# Patient Record
Sex: MALE | Race: BLACK OR AFRICAN AMERICAN | NOT HISPANIC OR LATINO | Employment: PART TIME | URBAN - METROPOLITAN AREA
[De-identification: names, ages, dates, MRNs, and addresses within clinical notes are randomized per-mention and may not be internally consistent; named-entity substitution may affect disease eponyms.]

---

## 2017-05-21 ENCOUNTER — HOSPITAL ENCOUNTER (EMERGENCY)
Facility: HOSPITAL | Age: 27
Discharge: HOME | End: 2017-05-21
Attending: EMERGENCY MEDICINE
Payer: MEDICAID

## 2017-05-21 VITALS
DIASTOLIC BLOOD PRESSURE: 61 MMHG | WEIGHT: 155 LBS | TEMPERATURE: 99 F | OXYGEN SATURATION: 99 % | HEART RATE: 80 BPM | SYSTOLIC BLOOD PRESSURE: 108 MMHG | RESPIRATION RATE: 18 BRPM

## 2017-05-21 DIAGNOSIS — R05.9 COUGH: ICD-10-CM

## 2017-05-21 DIAGNOSIS — J06.9 VIRAL URI WITH COUGH: Primary | ICD-10-CM

## 2017-05-21 LAB
FLUAV AG SPEC QL IA: NEGATIVE
FLUBV AG SPEC QL IA: NEGATIVE
SPECIMEN SOURCE: NORMAL

## 2017-05-21 PROCEDURE — 87400 INFLUENZA A/B EACH AG IA: CPT | Mod: 59

## 2017-05-21 PROCEDURE — 99284 EMERGENCY DEPT VISIT MOD MDM: CPT

## 2017-05-21 PROCEDURE — 25000003 PHARM REV CODE 250: Performed by: EMERGENCY MEDICINE

## 2017-05-21 RX ORDER — IBUPROFEN 600 MG/1
600 TABLET ORAL
Status: COMPLETED | OUTPATIENT
Start: 2017-05-21 | End: 2017-05-21

## 2017-05-21 RX ORDER — BENZONATATE 100 MG/1
100 CAPSULE ORAL 3 TIMES DAILY PRN
Qty: 20 CAPSULE | Refills: 0 | Status: SHIPPED | OUTPATIENT
Start: 2017-05-21 | End: 2017-05-31

## 2017-05-21 RX ADMIN — IBUPROFEN 600 MG: 600 TABLET, FILM COATED ORAL at 08:05

## 2017-05-22 NOTE — ED NOTES
Cough, cold, congestion with chills and body aches that began 1 day ago.  Reports white sputum with cough

## 2017-05-22 NOTE — ED PROVIDER NOTES
Encounter Date: 5/21/2017       History     Chief Complaint   Patient presents with    URI     pt to triage with x1 day of cough cold congestion and body aches     Review of patient's allergies indicates:  No Known Allergies  Sukumar tSephens, a 26 y.o. male, complains of cough and congestion and sharp chest pain that began this morning.  He said he took some TheraFlu with no relief.  He complains of body aches.  He said he felt like he had fever but did not verified with a thermometer.  One else is ill in his home.    Pain location: Body aches  Pain Severity: Moderate    Pain timing: One day  Pain character: Achy    Associated with or Modified by: (see above)              History reviewed. No pertinent past medical history.  History reviewed. No pertinent surgical history.  History reviewed. No pertinent family history.  Social History   Substance Use Topics    Smoking status: Current Every Day Smoker     Packs/day: 0.50     Types: Cigarettes    Smokeless tobacco: Not on file    Alcohol use Not on file     Review of Systems   Constitutional:        Objective fever   HENT: Positive for congestion, postnasal drip and rhinorrhea. Negative for sore throat.    Musculoskeletal: Positive for myalgias.   All other systems reviewed and are negative.      Physical Exam     Initial Vitals [05/21/17 1918]   BP Pulse Resp Temp SpO2   (!) 122/59 86 18 99.4 °F (37.4 °C) 98 %     Physical Exam    Nursing note and vitals reviewed.  Constitutional: He appears well-developed and well-nourished. No distress.   HENT:   Right Ear: External ear normal.   Left Ear: External ear normal.   Mouth/Throat: Oropharynx is clear and moist.   Eyes: Conjunctivae and EOM are normal. Pupils are equal, round, and reactive to light.   Neck: Normal range of motion. Neck supple.   Cardiovascular: Normal rate, regular rhythm and normal heart sounds.   Pulmonary/Chest: Breath sounds normal.   Occasional rhonchi on the left clears with coughing    Abdominal: Soft. There is no tenderness.   Musculoskeletal: Normal range of motion.   Neurological: He is alert and oriented to person, place, and time. He has normal strength.   Skin: Skin is warm and dry.   Psychiatric: He has a normal mood and affect. His behavior is normal. Thought content normal.         ED Course   Procedures  Labs Reviewed   INFLUENZA A AND B ANTIGEN             Medical Decision Making:   Initial Assessment:    26 y.o. male, complains of cough and congestion and sharp chest pain that began this morning.  He said he took some TheraFlu with no relief.  He complains of body aches.  He said he felt like he had fever but did not verified with a thermometer.  One else is ill in his home.    Pain location: Body aches  PE: No acute distress, afebrile, unremarkable physical exam except for mild nasal congestion  Differential Diagnosis:   Influenza, URI, bronchitis,  Clinical Tests:   Lab Tests: Ordered and Reviewed  Radiological Study: Ordered and Reviewed  ED Management:  Influenza strep and chest x-rays were all normal.  A she will be treated for an uncomplicated URI probably viral nature.                   ED Course     Clinical Impression:   The primary encounter diagnosis was Viral URI with cough. A diagnosis of Cough was also pertinent to this visit.          Jhonatan Zee Jr., MD  05/21/17 [id number]

## 2023-01-05 ENCOUNTER — TELEPHONE (OUTPATIENT)
Dept: UROLOGY | Facility: CLINIC | Age: 33
End: 2023-01-05

## 2023-01-05 ENCOUNTER — OFFICE VISIT (OUTPATIENT)
Dept: UROLOGY | Facility: CLINIC | Age: 33
End: 2023-01-05
Payer: MEDICAID

## 2023-01-05 DIAGNOSIS — N52.9 ERECTILE DYSFUNCTION, UNSPECIFIED ERECTILE DYSFUNCTION TYPE: ICD-10-CM

## 2023-01-05 DIAGNOSIS — Z31.41 ENCOUNTER FOR FERTILITY TESTING: Primary | ICD-10-CM

## 2023-01-05 DIAGNOSIS — R53.83 FATIGUE, UNSPECIFIED TYPE: ICD-10-CM

## 2023-01-05 PROCEDURE — 1160F RVW MEDS BY RX/DR IN RCRD: CPT | Mod: CPTII,95,, | Performed by: NURSE PRACTITIONER

## 2023-01-05 PROCEDURE — 1159F PR MEDICATION LIST DOCUMENTED IN MEDICAL RECORD: ICD-10-PCS | Mod: CPTII,95,, | Performed by: NURSE PRACTITIONER

## 2023-01-05 PROCEDURE — 99202 PR OFFICE/OUTPT VISIT, NEW, LEVL II, 15-29 MIN: ICD-10-PCS | Mod: 95,,, | Performed by: NURSE PRACTITIONER

## 2023-01-05 PROCEDURE — 1160F PR REVIEW ALL MEDS BY PRESCRIBER/CLIN PHARMACIST DOCUMENTED: ICD-10-PCS | Mod: CPTII,95,, | Performed by: NURSE PRACTITIONER

## 2023-01-05 PROCEDURE — 99202 OFFICE O/P NEW SF 15 MIN: CPT | Mod: 95,,, | Performed by: NURSE PRACTITIONER

## 2023-01-05 PROCEDURE — 1159F MED LIST DOCD IN RCRD: CPT | Mod: CPTII,95,, | Performed by: NURSE PRACTITIONER

## 2023-01-05 RX ORDER — TADALAFIL 5 MG/1
5 TABLET ORAL DAILY PRN
Qty: 20 TABLET | Refills: 11 | Status: SHIPPED | OUTPATIENT
Start: 2023-01-05 | End: 2023-05-03 | Stop reason: DRUGHIGH

## 2023-01-05 NOTE — TELEPHONE ENCOUNTER
Shanon and I both spoke with pt, labs and imaging scheduled and he will  specimen cup on tomorrow.    ----- Message from Joe Vasquez NP sent at 1/5/2023  2:55 PM CST -----  Regarding: appts needed  Patient needs an appt at [facility] for U/S of scrotum and testicles & testosterone level. Both on the same day. Testosterone level has to be done before 10 am. Patient also will pick-up a specimen cup and his orders for a semen analysis on tomorrow. He's aware that he has to drop this off at [facility] as well. All orders placed. Patient's aware you will give him a call today.

## 2023-01-05 NOTE — PROGRESS NOTES
Audio Only Telehealth Visit     The patient location is: Work  The chief complaint leading to consultation is: Fertility concerns and erectile issues.  Visit type: Virtual visit with audio only (telephone)  Total time spent with patient: Approximately 25 minutes     The reason for the audio only service rather than synchronous audio and video virtual visit was related to technical difficulties or patient preference/necessity.     Each patient to whom I provide medical services by telemedicine is:  (1) informed of the relationship between the physician and patient and the respective role of any other health care provider with respect to management of the patient; and (2) notified that they may decline to receive medical services by telemedicine and may withdraw from such care at any time. Patient verbally consented to receive this service via voice-only telephone call.       HPI: Patient is new to me. He is a 31 yo AAM who is present via telephone encounter with fertility concerns and erectile dysfunction. Patient reports he has been actively trying to achieve pregnancy with his partner. He reports one of his partners in the past achieved pregnancy in 2016, but she had a miscarriage. He has not been successful since. Patient reports socially using marijuana. In regards to his erectile issues, patient reports issues with achieving and maintaining an erection. Erections usually last 5-10 minutes. He reports performance anxiety. He denies any surgical history or injury to his genitals.     Assessment and plan:    Diagnoses and all orders for this visit:    Encounter for fertility testing  -     Semen Analysis; Future  -     US Scrotum And Testicles; Future    Erectile dysfunction, unspecified erectile dysfunction type  -     tadalafiL (CIALIS) 5 MG tablet; Take 1 tablet (5 mg total) by mouth daily as needed for Erectile Dysfunction.  -     Testosterone; Future    Fatigue, unspecified type  -     Testosterone;  Future    NOTE: Goodrx coupon sent to patient's cell phone.     Follow-up in 4 weeks.    Joe Vasquez NP             This service was not originating from a related E/M service provided within the previous 7 days nor will  to an E/M service or procedure within the next 24 hours or my soonest available appointment.  Prevailing standard of care was able to be met in this audio-only visit.

## 2023-01-05 NOTE — TELEPHONE ENCOUNTER
----- Message from Joe Vasquez NP sent at 1/5/2023  2:55 PM CST -----  Regarding: appts needed  Patient needs an appt at [facility] for U/S of scrotum and testicles & testosterone level. Both on the same day. Testosterone level has to be done before 10 am. Patient also will pick-up a specimen cup and his orders for a semen analysis on tomorrow. He's aware that he has to drop this off at [facility] as well. All orders placed. Patient's aware you will give him a call today.

## 2023-01-06 ENCOUNTER — TELEPHONE (OUTPATIENT)
Dept: UROLOGY | Facility: CLINIC | Age: 33
End: 2023-01-06
Payer: MEDICAID

## 2023-01-06 DIAGNOSIS — Z31.41 ENCOUNTER FOR FERTILITY TESTING: Primary | ICD-10-CM

## 2023-01-06 DIAGNOSIS — N52.9 ERECTILE DYSFUNCTION, UNSPECIFIED ERECTILE DYSFUNCTION TYPE: ICD-10-CM

## 2023-01-06 NOTE — TELEPHONE ENCOUNTER
Patient needs an appt at [facility] for U/S of scrotum and testicles & testosterone level. Both on the same day. Testosterone level has to be done before 10 am. Patient also will pick-up a specimen cup and his orders for a semen analysis on tomorrow. He's aware that he has to drop this off at [facility] as well. All orders placed. Patient's aware you will give him a call today.

## 2023-01-12 ENCOUNTER — HOSPITAL ENCOUNTER (OUTPATIENT)
Dept: RADIOLOGY | Facility: HOSPITAL | Age: 33
Discharge: HOME | End: 2023-01-12
Attending: NURSE PRACTITIONER
Payer: MEDICAID

## 2023-01-12 DIAGNOSIS — Z31.41 ENCOUNTER FOR FERTILITY TESTING: ICD-10-CM

## 2023-01-12 PROCEDURE — 76870 US SCROTUM AND TESTICLES: ICD-10-PCS | Mod: 26,,, | Performed by: STUDENT IN AN ORGANIZED HEALTH CARE EDUCATION/TRAINING PROGRAM

## 2023-01-12 PROCEDURE — 76870 US EXAM SCROTUM: CPT | Mod: 26,,, | Performed by: STUDENT IN AN ORGANIZED HEALTH CARE EDUCATION/TRAINING PROGRAM

## 2023-01-12 PROCEDURE — 76870 US EXAM SCROTUM: CPT | Mod: TC

## 2023-01-13 ENCOUNTER — TELEPHONE (OUTPATIENT)
Dept: UROLOGY | Facility: CLINIC | Age: 33
End: 2023-01-13
Payer: MEDICAID

## 2023-01-13 ENCOUNTER — LAB VISIT (OUTPATIENT)
Dept: LAB | Facility: HOSPITAL | Age: 33
End: 2023-01-13
Payer: MEDICAID

## 2023-01-13 DIAGNOSIS — Z31.41 ENCOUNTER FOR FERTILITY TESTING: ICD-10-CM

## 2023-01-13 DIAGNOSIS — N52.9 ERECTILE DYSFUNCTION, UNSPECIFIED ERECTILE DYSFUNCTION TYPE: ICD-10-CM

## 2023-01-13 PROCEDURE — 89320 SEMEN ANAL VOL/COUNT/MOT: CPT | Performed by: NURSE PRACTITIONER

## 2023-01-13 NOTE — TELEPHONE ENCOUNTER
----- Message from Joe Vasquez NP sent at 1/13/2023  2:26 PM CST -----  Please inform patient via telephone that his testosterone level was normal at 573 ng/dL.

## 2023-01-16 LAB — SPERM VIABLE NFR SMN: 40 %

## 2023-01-27 LAB
GERM CELLS, SEMEN: ABNORMAL
PH SMN: 8 [PH]
PMN'S/100 SPERMATAZOA: 0 %
SPECIMEN VOL SMN: 1.1 ML
SPERM # SMN: 68 M/ML
SPERM # SMN: 75 M
SPERM MOTILE NFR SMN: 6 %
SPERM NORM MOTILE # SMN: 0.9 M (ref 50–?)
SPERM NORM NFR SMN: 20 %
SPERM PROG NFR SMN: 4 %
SPERM SMN: ABNORMAL
VISC SMN QL: ABNORMAL
WBC # SMN: 0 M/ML (ref 0–1)

## 2023-05-03 ENCOUNTER — OFFICE VISIT (OUTPATIENT)
Dept: UROLOGY | Facility: CLINIC | Age: 33
End: 2023-05-03
Payer: MEDICAID

## 2023-05-03 VITALS
HEIGHT: 74 IN | DIASTOLIC BLOOD PRESSURE: 63 MMHG | SYSTOLIC BLOOD PRESSURE: 109 MMHG | HEART RATE: 54 BPM | WEIGHT: 156.63 LBS | BODY MASS INDEX: 20.1 KG/M2

## 2023-05-03 DIAGNOSIS — N52.9 ERECTILE DYSFUNCTION, UNSPECIFIED ERECTILE DYSFUNCTION TYPE: ICD-10-CM

## 2023-05-03 DIAGNOSIS — N46.9 MALE FERTILITY PROBLEM: Primary | ICD-10-CM

## 2023-05-03 PROCEDURE — 3078F DIAST BP <80 MM HG: CPT | Mod: CPTII,,, | Performed by: NURSE PRACTITIONER

## 2023-05-03 PROCEDURE — 3078F PR MOST RECENT DIASTOLIC BLOOD PRESSURE < 80 MM HG: ICD-10-PCS | Mod: CPTII,,, | Performed by: NURSE PRACTITIONER

## 2023-05-03 PROCEDURE — 3008F PR BODY MASS INDEX (BMI) DOCUMENTED: ICD-10-PCS | Mod: CPTII,,, | Performed by: NURSE PRACTITIONER

## 2023-05-03 PROCEDURE — 99214 PR OFFICE/OUTPT VISIT, EST, LEVL IV, 30-39 MIN: ICD-10-PCS | Mod: S$PBB,,, | Performed by: NURSE PRACTITIONER

## 2023-05-03 PROCEDURE — 1159F PR MEDICATION LIST DOCUMENTED IN MEDICAL RECORD: ICD-10-PCS | Mod: CPTII,,, | Performed by: NURSE PRACTITIONER

## 2023-05-03 PROCEDURE — 3074F PR MOST RECENT SYSTOLIC BLOOD PRESSURE < 130 MM HG: ICD-10-PCS | Mod: CPTII,,, | Performed by: NURSE PRACTITIONER

## 2023-05-03 PROCEDURE — 99999 PR PBB SHADOW E&M-EST. PATIENT-LVL III: CPT | Mod: PBBFAC,,, | Performed by: NURSE PRACTITIONER

## 2023-05-03 PROCEDURE — 1160F RVW MEDS BY RX/DR IN RCRD: CPT | Mod: CPTII,,, | Performed by: NURSE PRACTITIONER

## 2023-05-03 PROCEDURE — 1159F MED LIST DOCD IN RCRD: CPT | Mod: CPTII,,, | Performed by: NURSE PRACTITIONER

## 2023-05-03 PROCEDURE — 1160F PR REVIEW ALL MEDS BY PRESCRIBER/CLIN PHARMACIST DOCUMENTED: ICD-10-PCS | Mod: CPTII,,, | Performed by: NURSE PRACTITIONER

## 2023-05-03 PROCEDURE — 99213 OFFICE O/P EST LOW 20 MIN: CPT | Mod: PBBFAC,PO | Performed by: NURSE PRACTITIONER

## 2023-05-03 PROCEDURE — 3074F SYST BP LT 130 MM HG: CPT | Mod: CPTII,,, | Performed by: NURSE PRACTITIONER

## 2023-05-03 PROCEDURE — 3008F BODY MASS INDEX DOCD: CPT | Mod: CPTII,,, | Performed by: NURSE PRACTITIONER

## 2023-05-03 PROCEDURE — 99214 OFFICE O/P EST MOD 30 MIN: CPT | Mod: S$PBB,,, | Performed by: NURSE PRACTITIONER

## 2023-05-03 PROCEDURE — 99999 PR PBB SHADOW E&M-EST. PATIENT-LVL III: ICD-10-PCS | Mod: PBBFAC,,, | Performed by: NURSE PRACTITIONER

## 2023-05-03 RX ORDER — TADALAFIL 20 MG/1
20 TABLET ORAL DAILY PRN
Qty: 20 TABLET | Refills: 11 | Status: SHIPPED | OUTPATIENT
Start: 2023-05-03 | End: 2024-05-02

## 2023-05-03 NOTE — PATIENT INSTRUCTIONS
Semen analysis (home collect; drop specimen of to [facility] outpatient lab).   Increase tadalafil (Cialis) to 20 mg as needed daily for ED.   Follow-up pending semen analysis.

## 2023-05-03 NOTE — PROGRESS NOTES
Subjective:       Patient ID: Sukumar Stephens is a 32 y.o. male.    Chief Complaint: Follow-up (Semen )    Patient is here today for a follow-up for fertility issues. Semen analysis dated 1/13/2023 was abnormal and U/S of scrotum and testicles dated 1/12/2023 showed bilateral varicoceles which can be playing a factor in fertility issues. Re-discussed semen analysis and U/S results with patient. He's open to moving forward for varicocelectomy if that helps increase his chances to have baby. Patient also reports ED issues and trying honey packets in addition to taking two Cialis pills. He reports that is effective, but requested to increase his Cialis so he can stop taking other non-prescribed ED medications. Patient also reports a hx of anxiety.     Follow-up  This is a chronic (fertility issues) problem. The current episode started more than 1 month ago. The problem occurs daily. The problem has been unchanged. Pertinent negatives include no abdominal pain, anorexia, arthralgias, change in bowel habit, chills, fatigue, fever, headaches, nausea, swollen glands, urinary symptoms, vomiting or weakness. Nothing aggravates the symptoms. He has tried nothing for the symptoms.   Review of Systems   Constitutional:  Negative for chills, fatigue and fever.   Gastrointestinal:  Negative for abdominal pain, anorexia, change in bowel habit, constipation, diarrhea, nausea, vomiting and change in bowel habit.   Genitourinary:  Positive for erectile dysfunction. Negative for decreased urine volume, difficulty urinating, discharge, dysuria, flank pain, frequency, hematuria, penile pain, penile swelling, scrotal swelling, testicular pain and urgency.   Musculoskeletal:  Negative for arthralgias.   Neurological:  Negative for dizziness, weakness and headaches.   Psychiatric/Behavioral:  The patient is nervous/anxious.        Objective:      Physical Exam  Vitals and nursing note reviewed.   Constitutional:       General: He is not in  acute distress.     Appearance: He is well-developed and normal weight. He is not ill-appearing.   HENT:      Head: Normocephalic and atraumatic.   Eyes:      Pupils: Pupils are equal, round, and reactive to light.   Cardiovascular:      Rate and Rhythm: Bradycardia present.   Pulmonary:      Effort: Pulmonary effort is normal. No respiratory distress.   Abdominal:      Palpations: Abdomen is soft.      Tenderness: There is no abdominal tenderness.   Musculoskeletal:         General: Normal range of motion.      Cervical back: Normal range of motion.   Skin:     General: Skin is warm and dry.   Neurological:      Mental Status: He is alert and oriented to person, place, and time.      Coordination: Coordination normal.   Psychiatric:         Mood and Affect: Mood normal.         Behavior: Behavior normal.         Thought Content: Thought content normal.         Judgment: Judgment normal.       Assessment:       Problem List Items Addressed This Visit    None  Visit Diagnoses       Male fertility problem    -  Primary    Relevant Orders    Semen Analysis    Erectile dysfunction, unspecified erectile dysfunction type        Relevant Medications    tadalafiL (CIALIS) 20 MG Tab            Plan:           Sukumar MACIAS was seen today for follow-up.    Diagnoses and all orders for this visit:    Male fertility problem  -     Semen Analysis; Future    Erectile dysfunction, unspecified erectile dysfunction type  -     tadalafiL (CIALIS) 20 MG Tab; Take 1 tablet (20 mg total) by mouth daily as needed (erectile dysfunction).    Other orders  Semen analysis (home collect; drop specimen of to [facility] outpatient lab).   Increase tadalafil (Cialis) to 20 mg as needed daily for ED.     NOTE: May need varicocelectomy pending abnormal semen analysis results.      Follow-up pending semen analysis results.     Joe Vasquez NP

## 2023-09-11 ENCOUNTER — OFFICE VISIT (OUTPATIENT)
Dept: UROLOGY | Facility: CLINIC | Age: 33
End: 2023-09-11
Payer: COMMERCIAL

## 2023-09-11 VITALS
HEIGHT: 74 IN | BODY MASS INDEX: 19.48 KG/M2 | DIASTOLIC BLOOD PRESSURE: 58 MMHG | HEART RATE: 67 BPM | SYSTOLIC BLOOD PRESSURE: 100 MMHG | WEIGHT: 151.81 LBS

## 2023-09-11 DIAGNOSIS — N43.3 LEFT HYDROCELE: ICD-10-CM

## 2023-09-11 DIAGNOSIS — N46.9 MALE FERTILITY PROBLEM: Primary | ICD-10-CM

## 2023-09-11 DIAGNOSIS — N50.89 TESTICULAR MICROLITHIASIS: ICD-10-CM

## 2023-09-11 DIAGNOSIS — N50.3 EPIDIDYMAL CYST: ICD-10-CM

## 2023-09-11 DIAGNOSIS — I86.1 BILATERAL VARICOCELES: ICD-10-CM

## 2023-09-11 PROCEDURE — 99214 PR OFFICE/OUTPT VISIT, EST, LEVL IV, 30-39 MIN: ICD-10-PCS | Mod: S$GLB,,, | Performed by: NURSE PRACTITIONER

## 2023-09-11 PROCEDURE — 3008F BODY MASS INDEX DOCD: CPT | Mod: CPTII,S$GLB,, | Performed by: NURSE PRACTITIONER

## 2023-09-11 PROCEDURE — 3008F PR BODY MASS INDEX (BMI) DOCUMENTED: ICD-10-PCS | Mod: CPTII,S$GLB,, | Performed by: NURSE PRACTITIONER

## 2023-09-11 PROCEDURE — 1159F PR MEDICATION LIST DOCUMENTED IN MEDICAL RECORD: ICD-10-PCS | Mod: CPTII,S$GLB,, | Performed by: NURSE PRACTITIONER

## 2023-09-11 PROCEDURE — 99999 PR PBB SHADOW E&M-EST. PATIENT-LVL III: ICD-10-PCS | Mod: PBBFAC,,, | Performed by: NURSE PRACTITIONER

## 2023-09-11 PROCEDURE — 3078F PR MOST RECENT DIASTOLIC BLOOD PRESSURE < 80 MM HG: ICD-10-PCS | Mod: CPTII,S$GLB,, | Performed by: NURSE PRACTITIONER

## 2023-09-11 PROCEDURE — 99999 PR PBB SHADOW E&M-EST. PATIENT-LVL III: CPT | Mod: PBBFAC,,, | Performed by: NURSE PRACTITIONER

## 2023-09-11 PROCEDURE — 99213 OFFICE O/P EST LOW 20 MIN: CPT | Mod: PBBFAC,PO | Performed by: NURSE PRACTITIONER

## 2023-09-11 PROCEDURE — 1159F MED LIST DOCD IN RCRD: CPT | Mod: CPTII,S$GLB,, | Performed by: NURSE PRACTITIONER

## 2023-09-11 PROCEDURE — 1160F RVW MEDS BY RX/DR IN RCRD: CPT | Mod: CPTII,S$GLB,, | Performed by: NURSE PRACTITIONER

## 2023-09-11 PROCEDURE — 3074F SYST BP LT 130 MM HG: CPT | Mod: CPTII,S$GLB,, | Performed by: NURSE PRACTITIONER

## 2023-09-11 PROCEDURE — 99214 OFFICE O/P EST MOD 30 MIN: CPT | Mod: S$GLB,,, | Performed by: NURSE PRACTITIONER

## 2023-09-11 PROCEDURE — 3074F PR MOST RECENT SYSTOLIC BLOOD PRESSURE < 130 MM HG: ICD-10-PCS | Mod: CPTII,S$GLB,, | Performed by: NURSE PRACTITIONER

## 2023-09-11 PROCEDURE — 1160F PR REVIEW ALL MEDS BY PRESCRIBER/CLIN PHARMACIST DOCUMENTED: ICD-10-PCS | Mod: CPTII,S$GLB,, | Performed by: NURSE PRACTITIONER

## 2023-09-11 PROCEDURE — 3078F DIAST BP <80 MM HG: CPT | Mod: CPTII,S$GLB,, | Performed by: NURSE PRACTITIONER

## 2023-09-11 NOTE — PATIENT INSTRUCTIONS
U/S scrotum ans testicles (STAT)  Repeat semen analysis (home collect; drop specimen off to [facility] outpatient lab on 2nd floor within 1 hour of collection).  Follow-up pending lab and imaging results.

## 2023-09-11 NOTE — PROGRESS NOTES
Subjective:       Patient ID: Sukumar Stephens is a 32 y.o. male.    Chief Complaint: Follow-up    Patient is here today for a follow-up for fertility issues. Patient's semen analysis from 1/2023 showed motility issues. Patient also had an U/S in January that showed bilateral varicoceles, bilateral testicular microliths, a left hydrocele, and right epididymal cyst. Patient is aware that the bilateral varicoceles could be contributing to his fertility issues.     Follow-up  This is a chronic (fertility issues) problem. The current episode started more than 1 year ago. The problem has been unchanged. Pertinent negatives include no abdominal pain, change in bowel habit, chills, fatigue, fever, headaches, nausea, swollen glands, urinary symptoms, vomiting or weakness. Nothing aggravates the symptoms. He has tried nothing for the symptoms.     Review of Systems   Constitutional:  Negative for chills, fatigue and fever.   Gastrointestinal:  Negative for abdominal pain, change in bowel habit, constipation, diarrhea, nausea, vomiting and change in bowel habit.   Genitourinary:  Negative for decreased urine volume, difficulty urinating, discharge, dysuria, flank pain, frequency, hematuria, penile pain, penile swelling, scrotal swelling, testicular pain and urgency.   Musculoskeletal: Negative.    Neurological:  Negative for dizziness, weakness and headaches.   Psychiatric/Behavioral: Negative.           Objective:      Physical Exam  Vitals and nursing note reviewed.   Constitutional:       General: He is not in acute distress.     Appearance: He is well-developed and normal weight. He is not ill-appearing.   HENT:      Head: Normocephalic and atraumatic.   Eyes:      Pupils: Pupils are equal, round, and reactive to light.   Cardiovascular:      Rate and Rhythm: Normal rate.   Pulmonary:      Effort: Pulmonary effort is normal. No respiratory distress.   Abdominal:      Palpations: Abdomen is soft.      Tenderness: There is no  abdominal tenderness.   Musculoskeletal:         General: Normal range of motion.      Cervical back: Normal range of motion.   Skin:     General: Skin is warm and dry.   Neurological:      Mental Status: He is alert and oriented to person, place, and time.      Coordination: Coordination normal.   Psychiatric:         Mood and Affect: Mood normal.         Behavior: Behavior normal.         Thought Content: Thought content normal.         Judgment: Judgment normal.         Assessment:       Problem List Items Addressed This Visit    None  Visit Diagnoses       Male fertility problem    -  Primary    Fatigue, unspecified type        Bilateral varicoceles        Testicular microlithiasis        Epididymal cyst        Left hydrocele                Plan:           Sukumar MACIAS was seen today for follow-up.    Diagnoses and all orders for this visit:    Male fertility problem  -     US Scrotum And Testicles; Future  -     Semen Analysis; Future    Bilateral varicoceles  -     US Scrotum And Testicles; Future    Testicular microlithiasis  -     US Scrotum And Testicles; Future    Epididymal cyst  -     US Scrotum And Testicles; Future    Left hydrocele  -     US Scrotum And Testicles; Future    Other order  Repeat semen analysis (home collect; drop specimen off to [facility] outpatient lab on 2nd floor within 1 hour of collection).    Follow-up pending lab and imaging results.     Joe Vasquez NP

## 2023-09-12 ENCOUNTER — LAB VISIT (OUTPATIENT)
Dept: LAB | Facility: HOSPITAL | Age: 33
End: 2023-09-12
Payer: MEDICAID

## 2023-09-12 ENCOUNTER — TELEPHONE (OUTPATIENT)
Dept: UROLOGY | Facility: CLINIC | Age: 33
End: 2023-09-12
Payer: COMMERCIAL

## 2023-09-12 DIAGNOSIS — N46.9 MALE FERTILITY PROBLEM: ICD-10-CM

## 2023-09-12 DIAGNOSIS — Z01.818 PRE-OP TESTING: Primary | ICD-10-CM

## 2023-09-12 LAB — SPERM VIABLE NFR SMN: 37 %

## 2023-09-12 PROCEDURE — 89320 SEMEN ANAL VOL/COUNT/MOT: CPT | Performed by: NURSE PRACTITIONER

## 2023-09-12 NOTE — TELEPHONE ENCOUNTER
Spoke to patient and results given, he would like to move surgery date and picked a date of 9/18, he will do pre-op labs on 9/14. He is also not taking any blood thinners and was told to refrain from using any until after surgery. Notified him that they will call him with arrival time on Friday. Also notified him that we will mail surgery packet to his house for him to have.

## 2023-09-12 NOTE — TELEPHONE ENCOUNTER
----- Message from Joe Vasquez NP sent at 9/12/2023  4:05 PM CDT -----  Please inform patient that his U/S of scrotum and testicles still showed bilateral varicoceles and bilateral testicular microlithiasis. Due to fertility issues and concerns, we should move forward with scheduling sx for bilateral varicocelectomy by Dr. Rodriguez. We will continue to monitor the testicular microlithiasis (calcifications). Please help patient pick out sx date. Pre-op labs needed (CBC, BMP, U/A, and urine cx). No blood thinners noted on med list, but patient should continue to avoid them for 7-10 days prior to sx to reduce risk of bleeding. Please provide patient with surgery packet. Let me know what date patient selects. Thanks.

## 2023-09-13 ENCOUNTER — LAB VISIT (OUTPATIENT)
Dept: LAB | Facility: HOSPITAL | Age: 33
End: 2023-09-13
Payer: COMMERCIAL

## 2023-09-13 DIAGNOSIS — Z01.818 PRE-OP TESTING: ICD-10-CM

## 2023-09-13 DIAGNOSIS — N46.9 MALE FERTILITY PROBLEM: ICD-10-CM

## 2023-09-13 DIAGNOSIS — I86.1 BILATERAL VARICOCELES: Primary | ICD-10-CM

## 2023-09-13 LAB
ANION GAP SERPL CALC-SCNC: 8 MMOL/L (ref 8–16)
BASOPHILS # BLD AUTO: 0.1 K/UL (ref 0–0.2)
BASOPHILS NFR BLD: 2 % (ref 0–1.9)
BUN SERPL-MCNC: 14 MG/DL (ref 6–20)
CALCIUM SERPL-MCNC: 9.7 MG/DL (ref 8.7–10.5)
CHLORIDE SERPL-SCNC: 105 MMOL/L (ref 95–110)
CO2 SERPL-SCNC: 28 MMOL/L (ref 23–29)
CREAT SERPL-MCNC: 0.9 MG/DL (ref 0.5–1.4)
DIFFERENTIAL METHOD: ABNORMAL
EOSINOPHIL # BLD AUTO: 0.2 K/UL (ref 0–0.5)
EOSINOPHIL NFR BLD: 4.7 % (ref 0–8)
ERYTHROCYTE [DISTWIDTH] IN BLOOD BY AUTOMATED COUNT: 12.5 % (ref 11.5–14.5)
EST. GFR  (NO RACE VARIABLE): >60 ML/MIN/1.73 M^2
GLUCOSE SERPL-MCNC: 84 MG/DL (ref 70–110)
HCT VFR BLD AUTO: 36.5 % (ref 40–54)
HGB BLD-MCNC: 12.3 G/DL (ref 14–18)
IMM GRANULOCYTES # BLD AUTO: 0.01 K/UL (ref 0–0.04)
IMM GRANULOCYTES NFR BLD AUTO: 0.2 % (ref 0–0.5)
LYMPHOCYTES # BLD AUTO: 2.3 K/UL (ref 1–4.8)
LYMPHOCYTES NFR BLD: 45.7 % (ref 18–48)
MCH RBC QN AUTO: 30.5 PG (ref 27–31)
MCHC RBC AUTO-ENTMCNC: 33.7 G/DL (ref 32–36)
MCV RBC AUTO: 91 FL (ref 82–98)
MONOCYTES # BLD AUTO: 0.4 K/UL (ref 0.3–1)
MONOCYTES NFR BLD: 8.7 % (ref 4–15)
NEUTROPHILS # BLD AUTO: 2 K/UL (ref 1.8–7.7)
NEUTROPHILS NFR BLD: 38.7 % (ref 38–73)
NRBC BLD-RTO: 0 /100 WBC
PLATELET # BLD AUTO: 273 K/UL (ref 150–450)
PMV BLD AUTO: 10 FL (ref 9.2–12.9)
POTASSIUM SERPL-SCNC: 3.9 MMOL/L (ref 3.5–5.1)
RBC # BLD AUTO: 4.03 M/UL (ref 4.6–6.2)
SODIUM SERPL-SCNC: 141 MMOL/L (ref 136–145)
WBC # BLD AUTO: 5.06 K/UL (ref 3.9–12.7)

## 2023-09-13 PROCEDURE — 36415 COLL VENOUS BLD VENIPUNCTURE: CPT | Performed by: NURSE PRACTITIONER

## 2023-09-13 PROCEDURE — 80048 BASIC METABOLIC PNL TOTAL CA: CPT | Performed by: NURSE PRACTITIONER

## 2023-09-13 PROCEDURE — 85025 COMPLETE CBC W/AUTO DIFF WBC: CPT | Performed by: NURSE PRACTITIONER

## 2023-09-13 RX ORDER — CEFAZOLIN SODIUM 2 G/50ML
2 SOLUTION INTRAVENOUS
Status: CANCELLED | OUTPATIENT
Start: 2023-09-13

## 2023-09-13 RX ORDER — SODIUM CHLORIDE 9 MG/ML
INJECTION, SOLUTION INTRAVENOUS CONTINUOUS
Status: CANCELLED | OUTPATIENT
Start: 2023-09-13

## 2023-09-13 NOTE — PROGRESS NOTES
Diagnoses and all orders for this visit:    Bilateral varicoceles  -     Case Request Operating Room: EXCISION, VARICOCELE  -     Vital Signs ; Standing  -     Notify physician ; Standing  -     Diet NPO; Standing  -     Place in Outpatient; Standing  -     Place NICOLE hose; Standing  -     Diet NPO    Male fertility problem  -     Case Request Operating Room: EXCISION, VARICOCELE  -     Vital Signs ; Standing  -     Notify physician ; Standing  -     Diet NPO; Standing  -     Place in Outpatient; Standing  -     Place NICOLE hose; Standing  -     Diet NPO    Other orders  -     0.9%  NaCl infusion  -     IP VTE LOW RISK PATIENT; Standing  -     cefazolin (ANCEF) 2 gram in dextrose 5% 50 mL IVPB (premix)    Joe Vasquez, NP

## 2023-09-18 PROBLEM — N43.3 HYDROCELE, LEFT: Status: ACTIVE | Noted: 2023-09-18

## 2023-09-18 PROBLEM — I86.1 BILATERAL VARICOCELES: Status: ACTIVE | Noted: 2023-09-18

## 2023-09-18 PROBLEM — N46.9 MALE FERTILITY PROBLEM: Status: ACTIVE | Noted: 2023-09-18

## 2023-09-22 ENCOUNTER — TELEPHONE (OUTPATIENT)
Dept: UROLOGY | Facility: CLINIC | Age: 33
End: 2023-09-22
Payer: COMMERCIAL

## 2023-09-22 NOTE — TELEPHONE ENCOUNTER
Received a call from the hematology department and she stated that regarding the patient semen analysis the morphology portion of the semen analysis were lost, she stated that the slides were misplaced which is why it still shows preliminary result. She stated if you wanted patient to get the full test, including morphology done again they will do so for no charge. Let me know what you would like to do regarding this.

## 2023-09-22 NOTE — TELEPHONE ENCOUNTER
----- Message from Patricia Zhang sent at 9/22/2023  2:28 PM CDT -----  Contact: [facility]  .Type:  Needs Medical Advice    Who Called:  JOSE MIGUEL [facility] - HEMATOLOGY  Symptoms (please be specific):    How long has patient had these symptoms:    Pharmacy name and phone #:    Would the patient rather a call back or a response via [facility]?   Best Call Back Number: [phone]  Additional Information:

## 2023-10-03 ENCOUNTER — TELEPHONE (OUTPATIENT)
Dept: UROLOGY | Facility: CLINIC | Age: 33
End: 2023-10-03
Payer: COMMERCIAL

## 2023-10-03 NOTE — TELEPHONE ENCOUNTER
----- Message from Sukhwinder Nunez sent at 10/3/2023  2:39 PM CDT -----  Regarding: Patient advice  Contact: Pt  Pt called in regards to getting an letter for work       Pt can be reached at [phone]

## 2023-10-03 NOTE — LETTER
October 3, 2023      [facility]  [address]  [address]  [place] [address]  Phone: [phone]  Fax: [phone]       Patient: Sukumar Stephens   YOB: 1990  Date of Visit: 10/03/2023    To Whom It May Concern:    Sukumar Stephens  had a procedure on 9/18/2023 with Dr. Chet Rodriguez. The patient may return to work/school on  10/5/2023 with no restrictions. If you have any questions or concerns, or if I can be of further assistance, please do not hesitate to contact me.    Sincerely,    Dr.Brian Rodriguez

## 2023-10-03 NOTE — TELEPHONE ENCOUNTER
Patient called asking is he cleared to go back to work or for light duty, he had surgery for varicocele on 9/18, does he have any restrictions? If so let us know so we can type this up in a letter for patient

## 2023-10-04 LAB
GERM CELLS, SEMEN: ABNORMAL
PH SMN: 8 [PH]
PMN'S/100 SPERMATAZOA: ABNORMAL %
SPECIMEN VOL SMN: 1 ML
SPERM # SMN: 16 M
SPERM # SMN: 16 M/ML
SPERM MOTILE NFR SMN: 2 %
SPERM NORM NFR SMN: ABNORMAL %
SPERM PROG NFR SMN: 2 %
SPERM SMN: ABNORMAL
VISC SMN QL: ABNORMAL
WBC # SMN: ABNORMAL M/ML (ref 0–1)

## 2023-10-11 ENCOUNTER — OFFICE VISIT (OUTPATIENT)
Dept: UROLOGY | Facility: CLINIC | Age: 33
End: 2023-10-11
Payer: MEDICAID

## 2023-10-11 VITALS
BODY MASS INDEX: 19.58 KG/M2 | HEART RATE: 84 BPM | SYSTOLIC BLOOD PRESSURE: 109 MMHG | WEIGHT: 152.56 LBS | HEIGHT: 74 IN | DIASTOLIC BLOOD PRESSURE: 55 MMHG

## 2023-10-11 DIAGNOSIS — N43.3 HYDROCELE, LEFT: ICD-10-CM

## 2023-10-11 DIAGNOSIS — Z98.890 POST-OPERATIVE STATE: Primary | ICD-10-CM

## 2023-10-11 DIAGNOSIS — N46.9 MALE FERTILITY PROBLEM: ICD-10-CM

## 2023-10-11 DIAGNOSIS — R39.15 URINARY URGENCY: ICD-10-CM

## 2023-10-11 DIAGNOSIS — I86.1 BILATERAL VARICOCELES: ICD-10-CM

## 2023-10-11 LAB
BILIRUB UR QL STRIP: NEGATIVE
CLARITY UR REFRACT.AUTO: CLEAR
COLOR UR AUTO: YELLOW
GLUCOSE UR QL STRIP: NEGATIVE
HGB UR QL STRIP: NEGATIVE
KETONES UR QL STRIP: NEGATIVE
LEUKOCYTE ESTERASE UR QL STRIP: ABNORMAL
MICROSCOPIC COMMENT: NORMAL
NITRITE UR QL STRIP: NEGATIVE
PH UR STRIP: 6 [PH] (ref 5–8)
PROT UR QL STRIP: NEGATIVE
RBC #/AREA URNS AUTO: 1 /HPF (ref 0–4)
SP GR UR STRIP: 1.01 (ref 1–1.03)
SQUAMOUS #/AREA URNS AUTO: 0 /HPF
URN SPEC COLLECT METH UR: ABNORMAL
WBC #/AREA URNS AUTO: 1 /HPF (ref 0–5)

## 2023-10-11 PROCEDURE — 87088 URINE BACTERIA CULTURE: CPT | Performed by: NURSE PRACTITIONER

## 2023-10-11 PROCEDURE — 87077 CULTURE AEROBIC IDENTIFY: CPT | Performed by: NURSE PRACTITIONER

## 2023-10-11 PROCEDURE — 99024 POSTOP FOLLOW-UP VISIT: CPT | Mod: ,,, | Performed by: NURSE PRACTITIONER

## 2023-10-11 PROCEDURE — 3074F SYST BP LT 130 MM HG: CPT | Mod: CPTII,,, | Performed by: NURSE PRACTITIONER

## 2023-10-11 PROCEDURE — 99999 PR PBB SHADOW E&M-EST. PATIENT-LVL III: CPT | Mod: PBBFAC,,, | Performed by: NURSE PRACTITIONER

## 2023-10-11 PROCEDURE — 87186 SC STD MICRODIL/AGAR DIL: CPT | Performed by: NURSE PRACTITIONER

## 2023-10-11 PROCEDURE — 99213 OFFICE O/P EST LOW 20 MIN: CPT | Mod: PBBFAC,PO | Performed by: NURSE PRACTITIONER

## 2023-10-11 PROCEDURE — 1159F PR MEDICATION LIST DOCUMENTED IN MEDICAL RECORD: ICD-10-PCS | Mod: CPTII,,, | Performed by: NURSE PRACTITIONER

## 2023-10-11 PROCEDURE — 1160F RVW MEDS BY RX/DR IN RCRD: CPT | Mod: CPTII,,, | Performed by: NURSE PRACTITIONER

## 2023-10-11 PROCEDURE — 99024 PR POST-OP FOLLOW-UP VISIT: ICD-10-PCS | Mod: ,,, | Performed by: NURSE PRACTITIONER

## 2023-10-11 PROCEDURE — 1159F MED LIST DOCD IN RCRD: CPT | Mod: CPTII,,, | Performed by: NURSE PRACTITIONER

## 2023-10-11 PROCEDURE — 3074F PR MOST RECENT SYSTOLIC BLOOD PRESSURE < 130 MM HG: ICD-10-PCS | Mod: CPTII,,, | Performed by: NURSE PRACTITIONER

## 2023-10-11 PROCEDURE — 99999 PR PBB SHADOW E&M-EST. PATIENT-LVL III: ICD-10-PCS | Mod: PBBFAC,,, | Performed by: NURSE PRACTITIONER

## 2023-10-11 PROCEDURE — 3078F DIAST BP <80 MM HG: CPT | Mod: CPTII,,, | Performed by: NURSE PRACTITIONER

## 2023-10-11 PROCEDURE — 3078F PR MOST RECENT DIASTOLIC BLOOD PRESSURE < 80 MM HG: ICD-10-PCS | Mod: CPTII,,, | Performed by: NURSE PRACTITIONER

## 2023-10-11 PROCEDURE — 87086 URINE CULTURE/COLONY COUNT: CPT | Performed by: NURSE PRACTITIONER

## 2023-10-11 PROCEDURE — 1160F PR REVIEW ALL MEDS BY PRESCRIBER/CLIN PHARMACIST DOCUMENTED: ICD-10-PCS | Mod: CPTII,,, | Performed by: NURSE PRACTITIONER

## 2023-10-11 PROCEDURE — 81001 URINALYSIS AUTO W/SCOPE: CPT | Performed by: NURSE PRACTITIONER

## 2023-10-11 NOTE — PROGRESS NOTES
Subjective:       Patient ID: Sukumar Stephens is a 32 y.o. male.    Chief Complaint: Post-op Evaluation    Patient is here today for his post-op evaluation. He is S/P bilateral varicocelectomy and left hydrocelectomy by Dr. Rodriguez on 9/18/2023. Patient reports doing ok at this time. He reports some stitches are still in place and he has some tenderness at surgical site. Patient also reports urinary urgency, but not sure if this is new symptom since sx or has been going on.     Other  Chronicity: S/P bilateral varicocelectomy and left hydrocelectomy. Episode onset: 9/18/2023. The problem has been gradually improving. Pertinent negatives include no abdominal pain, anorexia, arthralgias, change in bowel habit, chills, fatigue, fever, headaches, nausea, swollen glands, urinary symptoms, vomiting or weakness. Nothing aggravates the symptoms. Treatments tried: bilateral varicocelectomy and left hydrocelectomy. The treatment provided moderate relief.     Review of Systems   Constitutional:  Negative for chills, fatigue and fever.   Gastrointestinal:  Negative for abdominal pain, anorexia, change in bowel habit, constipation, diarrhea, nausea and vomiting.   Genitourinary:  Positive for testicular pain (tenderness). Negative for decreased urine volume, difficulty urinating, discharge, dysuria, flank pain, frequency, hematuria, penile pain, penile swelling, scrotal swelling and urgency.   Musculoskeletal:  Negative for arthralgias.   Neurological:  Negative for dizziness, weakness and headaches.   Psychiatric/Behavioral: Negative.           Objective:      Physical Exam  Vitals and nursing note reviewed.   Constitutional:       General: He is not in acute distress.     Appearance: He is well-developed. He is obese. He is not ill-appearing.   HENT:      Head: Normocephalic and atraumatic.   Eyes:      Pupils: Pupils are equal, round, and reactive to light.   Cardiovascular:      Rate and Rhythm: Normal rate.   Pulmonary:       Effort: Pulmonary effort is normal. No respiratory distress.   Abdominal:      Palpations: Abdomen is soft.      Tenderness: There is no abdominal tenderness.   Musculoskeletal:         General: Normal range of motion.      Cervical back: Normal range of motion.   Skin:     General: Skin is warm and dry.   Neurological:      Mental Status: He is alert and oriented to person, place, and time.      Coordination: Coordination normal.   Psychiatric:         Mood and Affect: Mood normal.         Behavior: Behavior normal.         Thought Content: Thought content normal.         Judgment: Judgment normal.         Assessment:       Problem List Items Addressed This Visit          Renal/    Bilateral varicoceles    Male fertility problem    Hydrocele, left     Other Visit Diagnoses       Post-operative state    -  Primary            Plan:           Sukumar MACIAS was seen today for post-op evaluation.    Diagnoses and all orders for this visit:    Post-operative state    Bilateral varicoceles    Hydrocele, left    Male fertility problem    Urinary urgency  -     Urine culture  -     Urinalysis    Follow-up in 5 months for repeat semen analysis if pregnancy is not achieved with partner.    Joe Vasquez NP

## 2023-10-11 NOTE — PATIENT INSTRUCTIONS
U/A and urine cx today  Follow-up in 5 months for repeat semen analysis if pregnancy is not achieved with partner.

## 2023-10-13 ENCOUNTER — TELEPHONE (OUTPATIENT)
Dept: UROLOGY | Facility: CLINIC | Age: 33
End: 2023-10-13
Payer: COMMERCIAL

## 2023-10-13 DIAGNOSIS — N30.00 ACUTE CYSTITIS WITHOUT HEMATURIA: Primary | ICD-10-CM

## 2023-10-13 LAB — BACTERIA UR CULT: ABNORMAL

## 2023-10-13 RX ORDER — SULFAMETHOXAZOLE AND TRIMETHOPRIM 800; 160 MG/1; MG/1
1 TABLET ORAL 2 TIMES DAILY
Qty: 20 TABLET | Refills: 0 | Status: SHIPPED | OUTPATIENT
Start: 2023-10-13 | End: 2023-10-23

## 2023-10-13 NOTE — TELEPHONE ENCOUNTER
----- Message from Joe Vasquez NP sent at 10/13/2023 12:51 PM CDT -----  Please inform patient via telephone that his urine cx came back positive for a UTI. [facility] [place] [facility]

## 2024-08-16 DIAGNOSIS — N52.9 ERECTILE DYSFUNCTION, UNSPECIFIED ERECTILE DYSFUNCTION TYPE: ICD-10-CM

## 2024-08-16 RX ORDER — TADALAFIL 20 MG/1
20 TABLET ORAL DAILY PRN
Qty: 20 TABLET | Refills: 1 | Status: SHIPPED | OUTPATIENT
Start: 2024-08-16 | End: 2024-10-15

## 2024-10-15 ENCOUNTER — OFFICE VISIT (OUTPATIENT)
Dept: UROLOGY | Facility: CLINIC | Age: 34
End: 2024-10-15
Payer: MEDICAID

## 2024-10-15 VITALS
BODY MASS INDEX: 19.89 KG/M2 | SYSTOLIC BLOOD PRESSURE: 104 MMHG | HEART RATE: 60 BPM | DIASTOLIC BLOOD PRESSURE: 61 MMHG | HEIGHT: 74 IN | WEIGHT: 155 LBS

## 2024-10-15 DIAGNOSIS — N46.9 MALE FERTILITY PROBLEM: Primary | ICD-10-CM

## 2024-10-15 DIAGNOSIS — N50.89 TESTICULAR MICROLITHIASIS: ICD-10-CM

## 2024-10-15 PROCEDURE — 1159F MED LIST DOCD IN RCRD: CPT | Mod: CPTII,,, | Performed by: NURSE PRACTITIONER

## 2024-10-15 PROCEDURE — 99999 PR PBB SHADOW E&M-EST. PATIENT-LVL IV: CPT | Mod: PBBFAC,,, | Performed by: NURSE PRACTITIONER

## 2024-10-15 PROCEDURE — 99214 OFFICE O/P EST MOD 30 MIN: CPT | Mod: PBBFAC,PO | Performed by: NURSE PRACTITIONER

## 2024-10-15 PROCEDURE — 1160F RVW MEDS BY RX/DR IN RCRD: CPT | Mod: CPTII,,, | Performed by: NURSE PRACTITIONER

## 2024-10-15 PROCEDURE — 3078F DIAST BP <80 MM HG: CPT | Mod: CPTII,,, | Performed by: NURSE PRACTITIONER

## 2024-10-15 PROCEDURE — 3008F BODY MASS INDEX DOCD: CPT | Mod: CPTII,,, | Performed by: NURSE PRACTITIONER

## 2024-10-15 PROCEDURE — 3074F SYST BP LT 130 MM HG: CPT | Mod: CPTII,,, | Performed by: NURSE PRACTITIONER

## 2024-10-15 PROCEDURE — 99214 OFFICE O/P EST MOD 30 MIN: CPT | Mod: S$PBB,,, | Performed by: NURSE PRACTITIONER

## 2024-10-15 RX ORDER — CLOMIPHENE CITRATE 50 MG/1
TABLET ORAL
Qty: 25 TABLET | Refills: 5 | Status: SHIPPED | OUTPATIENT
Start: 2024-10-15

## 2024-10-15 NOTE — PROGRESS NOTES
Subjective:       Patient ID: Sukumar Stephens is a 33 y.o. male.    Chief Complaint: Annual Exam    Patient is a 34 yo AAM who is here today for male fertility issues. This is a chronic issue for patient. He has a hx of abnormal semen analysis, bilateral testicular microlithiasis, and bilateral varicoceles (S/P bilateral varicocelectomy by Dr. Rodriguez on 9/13/2023). Patient had recent semen analysis performed at outside fertility lab last month that showed abnormalities. Lab report can be found under Media tab in Epic. Patient has a new partner. She is 31 yrs old and does not have children. She is undergoing evaluation to make sure she is able to have children. They have been seriously trying for 1-2 months now. Patient is interested in starting clomid if he is a candidate. He stated the fertility clinic offer to start him on medication but he needed approval by me first.     Follow-up  This is a chronic (male fertility issue) problem. The current episode started more than 1 year ago. The problem occurs daily. The problem has been unchanged. Pertinent negatives include no abdominal pain, change in bowel habit, chills, fatigue, fever, nausea, swollen glands, urinary symptoms, vomiting or weakness. Nothing aggravates the symptoms. Treatments tried: varicocelectomy. The treatment provided no relief.     Review of Systems   Constitutional:  Negative for chills, fatigue and fever.   Gastrointestinal:  Negative for abdominal pain, change in bowel habit, nausea and vomiting.   Genitourinary:  Negative for decreased urine volume, difficulty urinating, discharge, dysuria, flank pain, frequency, hematuria, penile pain, penile swelling, scrotal swelling, testicular pain and urgency.   Neurological:  Negative for weakness.   Psychiatric/Behavioral: Negative.           Objective:      Physical Exam  Vitals and nursing note reviewed.   Constitutional:       General: He is not in acute distress.     Appearance: He is well-developed  "and normal weight. He is not ill-appearing.   HENT:      Head: Normocephalic and atraumatic.   Eyes:      Pupils: Pupils are equal, round, and reactive to light.   Cardiovascular:      Rate and Rhythm: Normal rate.   Pulmonary:      Effort: Pulmonary effort is normal. No respiratory distress.   Abdominal:      Palpations: Abdomen is soft.      Tenderness: There is no abdominal tenderness.   Musculoskeletal:         General: Normal range of motion.      Cervical back: Normal range of motion.   Skin:     General: Skin is warm and dry.   Neurological:      Mental Status: He is alert and oriented to person, place, and time.      Coordination: Coordination normal.   Psychiatric:         Mood and Affect: Mood normal.         Behavior: Behavior normal.         Thought Content: Thought content normal.         Judgment: Judgment normal.         Assessment:       Problem List Items Addressed This Visit          Renal/    Male fertility problem - Primary    Relevant Medications    clomiPHENE (CLOMID) 50 mg tablet    Other Relevant Orders    Prolactin    ESTRADIOL    Follicle Stimulating Hormone    Luteinizing Hormone    Testosterone    US Scrotum And Testicles     Other Visit Diagnoses       Testicular microlithiasis        Relevant Orders    US Scrotum And Testicles            Plan:           Sukumar Ram" was seen today for annual exam.    Diagnoses and all orders for this visit:    Male fertility problem  -     Prolactin; Future  -     ESTRADIOL; Future  -     Follicle Stimulating Hormone; Future  -     Luteinizing Hormone; Future  -     Testosterone; Future  -     US Scrotum And Testicles; Future  -     clomiPHENE (CLOMID) 50 mg tablet; Take 1 tablet (50 mg) by mouth daily for 25 days. Then off for 5 days. Repeat cycle for 6 months.    Testicular microlithiasis  -     US Scrotum And Testicles; Future    Other order  Start trial of clomiphene (Clomid) 50 mg daily for 25 days, then off for 5 days. Repeat cycle x6 months. "     Follow-up pending lab and U/S results.     YSabrina Vasquez, DNP

## 2024-10-15 NOTE — PATIENT INSTRUCTIONS
Start trial of clomiphene (Clomid) 50 mg daily for 25 days, then off for 5 days. Repeat cycle x6 months.   Prolactin, Estradiol, LH, FSH, and testosterone lab (to be done any morning at 8:00 AM.  U/S scrotum and testicles for evaluation of testicular microlithiasis.  Follow-up pending lab and U/S results.

## 2024-10-17 ENCOUNTER — TELEPHONE (OUTPATIENT)
Dept: UROLOGY | Facility: CLINIC | Age: 34
End: 2024-10-17
Payer: MEDICAID

## 2024-10-17 NOTE — TELEPHONE ENCOUNTER
I called and notified pt Clomid was denied by insurance. Recommended using the GoodRx card he was given at his appt. I told him if he has any trouble getting the rx filled to please notify the office.

## 2024-10-18 ENCOUNTER — TELEPHONE (OUTPATIENT)
Dept: UROLOGY | Facility: CLINIC | Age: 34
End: 2024-10-18
Payer: MEDICAID

## 2024-10-18 ENCOUNTER — HOSPITAL ENCOUNTER (OUTPATIENT)
Dept: RADIOLOGY | Facility: HOSPITAL | Age: 34
Discharge: HOME | End: 2024-10-18
Attending: NURSE PRACTITIONER
Payer: MEDICAID

## 2024-10-18 DIAGNOSIS — N46.9 MALE FERTILITY PROBLEM: ICD-10-CM

## 2024-10-18 DIAGNOSIS — N50.89 TESTICULAR MICROLITHIASIS: ICD-10-CM

## 2024-10-18 PROCEDURE — 93975 VASCULAR STUDY: CPT | Mod: 26,,, | Performed by: RADIOLOGY

## 2024-10-18 PROCEDURE — 76870 US EXAM SCROTUM: CPT | Mod: 26,,, | Performed by: RADIOLOGY

## 2024-10-18 PROCEDURE — 93975 VASCULAR STUDY: CPT | Mod: TC

## 2024-10-18 NOTE — TELEPHONE ENCOUNTER
----- Message from Joe Vasquez DNP sent at 10/18/2024  3:15 PM CDT -----  Please inform patient via telephone that his fertility labs (prolactin, LH, FSH, estradiol, and testosterone) came back normal. Start clomid treatment as we discussed and follow-up in 3 months.

## 2024-10-18 NOTE — TELEPHONE ENCOUNTER
----- Message from Joe Vasquez DNP sent at 10/18/2024  4:06 PM CDT -----  Please inform patient via telephone that his U/S of scrotum and testicles still showed bilateral testicular microlithiasis. No intratesticular mass or worrisome findings noted. Patient should perform monthly testicular self-examination. His U/S showed a right varicocele. We will continue to monitor this in regards to fertility.

## 2025-01-18 ENCOUNTER — HOSPITAL ENCOUNTER (EMERGENCY)
Facility: HOSPITAL | Age: 35
Discharge: HOME | End: 2025-01-19
Attending: STUDENT IN AN ORGANIZED HEALTH CARE EDUCATION/TRAINING PROGRAM
Payer: MEDICAID

## 2025-01-18 DIAGNOSIS — S43.402A SPRAIN OF LEFT SHOULDER, UNSPECIFIED SHOULDER SPRAIN TYPE, INITIAL ENCOUNTER: Primary | ICD-10-CM

## 2025-01-18 PROCEDURE — 96372 THER/PROPH/DIAG INJ SC/IM: CPT | Performed by: STUDENT IN AN ORGANIZED HEALTH CARE EDUCATION/TRAINING PROGRAM

## 2025-01-18 PROCEDURE — 99284 EMERGENCY DEPT VISIT MOD MDM: CPT | Mod: 25

## 2025-01-18 PROCEDURE — 63600175 PHARM REV CODE 636 W HCPCS: Mod: JZ,TB | Performed by: STUDENT IN AN ORGANIZED HEALTH CARE EDUCATION/TRAINING PROGRAM

## 2025-01-18 RX ORDER — KETOROLAC TROMETHAMINE 30 MG/ML
30 INJECTION, SOLUTION INTRAMUSCULAR; INTRAVENOUS
Status: COMPLETED | OUTPATIENT
Start: 2025-01-18 | End: 2025-01-18

## 2025-01-18 RX ORDER — KETOROLAC TROMETHAMINE 30 MG/ML
INJECTION, SOLUTION INTRAMUSCULAR; INTRAVENOUS
Status: DISPENSED
Start: 2025-01-18 | End: 2025-01-19

## 2025-01-18 RX ADMIN — KETOROLAC TROMETHAMINE 30 MG: 30 INJECTION, SOLUTION INTRAMUSCULAR; INTRAVENOUS at 11:01

## 2025-01-19 VITALS
WEIGHT: 165 LBS | HEIGHT: 74 IN | SYSTOLIC BLOOD PRESSURE: 120 MMHG | TEMPERATURE: 99 F | OXYGEN SATURATION: 97 % | BODY MASS INDEX: 21.17 KG/M2 | HEART RATE: 70 BPM | DIASTOLIC BLOOD PRESSURE: 69 MMHG | RESPIRATION RATE: 16 BRPM

## 2025-01-19 PROCEDURE — 96372 THER/PROPH/DIAG INJ SC/IM: CPT | Performed by: STUDENT IN AN ORGANIZED HEALTH CARE EDUCATION/TRAINING PROGRAM

## 2025-01-19 PROCEDURE — 63600175 PHARM REV CODE 636 W HCPCS: Performed by: STUDENT IN AN ORGANIZED HEALTH CARE EDUCATION/TRAINING PROGRAM

## 2025-01-19 RX ORDER — CYCLOBENZAPRINE HCL 10 MG
10 TABLET ORAL 3 TIMES DAILY PRN
Qty: 12 TABLET | Refills: 0 | Status: SHIPPED | OUTPATIENT
Start: 2025-01-19 | End: 2025-01-23

## 2025-01-19 RX ORDER — NAPROXEN 500 MG/1
500 TABLET ORAL 2 TIMES DAILY
Qty: 10 TABLET | Refills: 0 | Status: SHIPPED | OUTPATIENT
Start: 2025-01-19 | End: 2025-01-24

## 2025-01-19 RX ORDER — DEXAMETHASONE SODIUM PHOSPHATE 4 MG/ML
8 INJECTION, SOLUTION INTRA-ARTICULAR; INTRALESIONAL; INTRAMUSCULAR; INTRAVENOUS; SOFT TISSUE
Status: COMPLETED | OUTPATIENT
Start: 2025-01-19 | End: 2025-01-19

## 2025-01-19 RX ADMIN — DEXAMETHASONE SODIUM PHOSPHATE 8 MG: 4 INJECTION, SOLUTION INTRA-ARTICULAR; INTRALESIONAL; INTRAMUSCULAR; INTRAVENOUS; SOFT TISSUE at 12:01

## 2025-01-19 NOTE — ED PROVIDER NOTES
Encounter Date: 1/18/2025       History     Chief Complaint   Patient presents with    Shoulder Pain     Pt presents to the ED c/o pain to his left shoulder. Pt states he heard a pop and now is unable to move it at all.      HPI  34-year-old male no pertinent medical history presents brought in by self through triage for pain in the left shoulder, that started abruptly tonight, he states he are pop and since been unable to move it without severe pain.  Denies other trauma or injury.  Denies history of previous or similar.  Denies any other systemic or infectious symptoms or other acute complaints.  Review of patient's allergies indicates:  No Known Allergies  Past Medical History:   Diagnosis Date    Bilateral varicoceles      Past Surgical History:   Procedure Laterality Date    EXCISION OF HYDROCELE Left 9/18/2023    Procedure: HYDROCELECTOMY;  Surgeon: Chet Rodriguez MD;  Location: [facility] OR;  Service: Urology;  Laterality: Left;    VARICOCELECTOMY Bilateral 9/18/2023    Procedure: EXCISION, VARICOCELE;  Surgeon: Chet Rodriguez MD;  Location: [facility] OR;  Service: Urology;  Laterality: Bilateral;    WISDOM TOOTH EXTRACTION      surgery on one of wisdom teeth     No family history on file.  Social History     Tobacco Use    Smoking status: Former     Current packs/day: 0.50     Types: Cigarettes    Smokeless tobacco: Never   Substance Use Topics    Alcohol use: Yes     Alcohol/week: 8.0 standard drinks of alcohol     Types: 8 Cans of beer per week    Drug use: Yes     Types: Marijuana     Comment: daily   Medical surgical family and social history reviewed  Review of Systems  Complete review of systems was conducted and was negative except as per HPI and as marked  Physical Exam     Initial Vitals [01/18/25 2222]   BP Pulse Resp Temp SpO2   128/64 79 17 98.3 °F (36.8 °C) 97 %      MAP       --         Physical Exam  Physical:  General: Awake, alert, no acute distress  Head: Normocephalic, atraumatic  Neck:  Trachea midline, full range of motion, no meningismus  ENT: Atraumatic, Airway Patent  Cardio: Regular Rate, Regular Rhythm, skin perfusion normal  Chest: Atraumatic, No respiratory distress no use of accessory muscles to breath, normal rate  Upper Ext:  Guarding range of motion of the left shoulder.  Limb is warm well perfused and neurovascularly intact.  Lower Ext: Atraumatic, Inspection normal, no swelling  Neuro: No gross cranial nerve abnormality, no lateralization, no gross sensory or motor deficits  ED Course   Procedures  Labs Reviewed - No data to display       Imaging Results              X-Ray Shoulder 2 or More Views Left (Final result)  Result time 01/18/25 23:57:46      Final result by Elizabeth Mejia MD (01/18/25 23:57:46)                   Impression:      No acute fracture or dislocation involving the left shoulder.    Faint few mm size calcifications ejecting near the glenoid labrum as described.  Further evaluation can be made with MRI as clinically warranted.      Electronically signed by: Elizabeth Mejia  Date:    01/18/2025  Time:    23:57               Narrative:    EXAMINATION:  XR SHOULDER COMPLETE 2 OR MORE VIEWS LEFT    CLINICAL HISTORY:  shoulder pain;    TECHNIQUE:  Two or three views of the left shoulder were performed.    COMPARISON:  None    FINDINGS:  There is no evidence of acute fracture or dislocation involving the left shoulder imaged osseous structures.  There is faint calcification projecting just superior to the glenoid over the soft tissues which may represent an intra-articular or bursal type calcification nonspecific.  Visualized left ribs and left lung are unremarkable.                                       Medications   ketorolac injection 30 mg (30 mg Intramuscular Given 1/18/25 6565)     Medical Decision Making  Amount and/or Complexity of Data Reviewed  Radiology: ordered.    Risk  Prescription drug management.    34-year-old male no pertinent medical history  presents brought in by self through triage for pain in the left shoulder, that started abruptly tonight, he states he are pop and since been unable to move it without severe pain.  Denies other trauma or injury.  Denies history of previous or similar.  Denies any other systemic or infectious symptoms or other acute complaints.Will evaluate for acute pathology requiring intervention with appropriate studies, treat symptomatically, and reassess.    All studies reviewed, negative for acute pathology requiring intervention.  Scant calcifications consistent with calcific tendinopathy.  Patient reports he has actually been treated before for tendonitis elsewhere.  Diagnosis, use of prescription medications, need for follow-up regarding visit today, need to call for follow-up, expected course, and return precautions were all discussed at length in my traditional manner to which patient verbalized understanding and agrees and all questions were answered. Patient is well appearing and ambulatory at time of discharge.                                  Clinical Impression:  Final diagnoses:  [S47.402A] Sprain of left shoulder, unspecified shoulder sprain type, initial encounter (Primary)                 Jerson Melendez MD  01/19/25 0015

## 2025-01-19 NOTE — DISCHARGE INSTRUCTIONS
If symptoms persist follow up with orthopedist by calling for appointment.  Use medications as prescribed and return to the emergency department if condition worsens in any way.  Do not drive or operate machinery or mix with alcohol when taking cyclobenzaprine.  
There are no Wet Read(s) to document.

## 2025-06-10 ENCOUNTER — OFFICE VISIT (OUTPATIENT)
Dept: UROLOGY | Facility: CLINIC | Age: 35
End: 2025-06-10
Payer: MEDICAID

## 2025-06-10 VITALS
SYSTOLIC BLOOD PRESSURE: 100 MMHG | DIASTOLIC BLOOD PRESSURE: 63 MMHG | WEIGHT: 152.56 LBS | HEART RATE: 66 BPM | BODY MASS INDEX: 19.58 KG/M2 | HEIGHT: 74 IN

## 2025-06-10 DIAGNOSIS — N52.9 ERECTILE DYSFUNCTION, UNSPECIFIED ERECTILE DYSFUNCTION TYPE: ICD-10-CM

## 2025-06-10 DIAGNOSIS — N46.9 MALE FERTILITY PROBLEM: Primary | ICD-10-CM

## 2025-06-10 DIAGNOSIS — I86.1 RIGHT VARICOCELE: ICD-10-CM

## 2025-06-10 DIAGNOSIS — N50.89 TESTICULAR MICROLITHIASIS: ICD-10-CM

## 2025-06-10 PROCEDURE — 99214 OFFICE O/P EST MOD 30 MIN: CPT | Mod: PBBFAC,PO | Performed by: NURSE PRACTITIONER

## 2025-06-10 PROCEDURE — 1159F MED LIST DOCD IN RCRD: CPT | Mod: CPTII,,, | Performed by: NURSE PRACTITIONER

## 2025-06-10 PROCEDURE — 99214 OFFICE O/P EST MOD 30 MIN: CPT | Mod: S$PBB,,, | Performed by: NURSE PRACTITIONER

## 2025-06-10 PROCEDURE — 3074F SYST BP LT 130 MM HG: CPT | Mod: CPTII,,, | Performed by: NURSE PRACTITIONER

## 2025-06-10 PROCEDURE — 1160F RVW MEDS BY RX/DR IN RCRD: CPT | Mod: CPTII,,, | Performed by: NURSE PRACTITIONER

## 2025-06-10 PROCEDURE — 99999 PR PBB SHADOW E&M-EST. PATIENT-LVL IV: CPT | Mod: PBBFAC,,, | Performed by: NURSE PRACTITIONER

## 2025-06-10 PROCEDURE — 3008F BODY MASS INDEX DOCD: CPT | Mod: CPTII,,, | Performed by: NURSE PRACTITIONER

## 2025-06-10 PROCEDURE — 3078F DIAST BP <80 MM HG: CPT | Mod: CPTII,,, | Performed by: NURSE PRACTITIONER

## 2025-06-10 RX ORDER — TADALAFIL 20 MG/1
20 TABLET ORAL DAILY PRN
Qty: 20 TABLET | Refills: 5 | Status: SHIPPED | OUTPATIENT
Start: 2025-06-10 | End: 2025-06-10

## 2025-06-10 RX ORDER — TADALAFIL 20 MG/1
20 TABLET ORAL DAILY PRN
Qty: 30 TABLET | Refills: 5 | Status: SHIPPED | OUTPATIENT
Start: 2025-06-10 | End: 2025-06-10

## 2025-06-10 RX ORDER — TADALAFIL 20 MG/1
20 TABLET ORAL DAILY PRN
Qty: 30 TABLET | Refills: 5 | Status: SHIPPED | OUTPATIENT
Start: 2025-06-10 | End: 2025-12-07

## 2025-06-10 RX ORDER — CLOMIPHENE CITRATE 50 MG/1
TABLET ORAL
Qty: 25 TABLET | Refills: 5 | Status: SHIPPED | OUTPATIENT
Start: 2025-06-10

## 2025-06-10 RX ORDER — CLOMIPHENE CITRATE 50 MG/1
TABLET ORAL
Qty: 25 TABLET | Refills: 5 | Status: SHIPPED | OUTPATIENT
Start: 2025-06-10 | End: 2025-06-10

## 2025-06-10 NOTE — PATIENT INSTRUCTIONS
Start back taking Clomid 50 mg daily for 25 days, then off for 5 days. Repeat cycle x5.   Continue taking tadalafil (Cialis) 20 mg as needed daily for ED. REFILLED  Semen analysis (home collect; drop specimen off to [facility] on second floor within 1 hour specimen collection).  Things to consider: In order to have an adequate sperm count, do not ejaculate for 3 days before the test. However, that time should not be longer than 5 days, after which the quality can diminish. Please inform patient that the following may affect a man's fertility: alcohol, many reactional and prescription drugs, and tobacco.   U/S of scrotum and testicles for evaluation of right varicocele and testicular microlithiasis.  Follow-up pending semen analysis and U/S results.

## 2025-06-12 ENCOUNTER — TELEPHONE (OUTPATIENT)
Dept: UROLOGY | Facility: CLINIC | Age: 35
End: 2025-06-12
Payer: MEDICAID

## 2025-06-12 ENCOUNTER — RESULTS FOLLOW-UP (OUTPATIENT)
Dept: UROLOGY | Facility: CLINIC | Age: 35
End: 2025-06-12

## 2025-06-12 NOTE — TELEPHONE ENCOUNTER
Spoke with patient about u/s results and plan of care, pt did not get semen analysis done yet so I encourage him to get that done and pt verbally understood stating that he would get it done tomorrow.

## 2025-06-12 NOTE — TELEPHONE ENCOUNTER
----- Message from Joe Vasquez DNP sent at 6/12/2025 12:50 PM CDT -----  Please inform patient via telephone that his U/S of scrotum and testicles showed bilateral varicoceles (dilated veins in scrotal sac). These can play a factor in fertility issues. I will review with   Dr. Rodriguez and we get back with patient next week with plan of care. Find out if patient had his semen analysis performed because I don't see it as being in process in Epic. If not done, please   encourage him to have this done as soon as possible because we need that information before developing plan of care. Thanks.   ----- Message -----  From: Interface, Rad Results In  Sent: 6/10/2025   3:36 PM CDT  To: Joe Vasquez DNP

## 2025-06-23 ENCOUNTER — APPOINTMENT (OUTPATIENT)
Dept: LAB | Facility: HOSPITAL | Age: 35
End: 2025-06-23
Payer: MEDICAID

## 2025-06-26 NOTE — PROGRESS NOTES
Subjective:       Patient ID: Sukumar Stephens is a 34 y.o. male.    Chief Complaint: Follow-up    History of Present Illness    CHIEF COMPLAINT:  Patient presents today for follow-up after varicocele surgery and fertility concerns.    FERTILITY HISTORY:  He is currently undergoing fertility treatment with his 30-year-old partner at [place] [facility]. The couple experienced a miscarriage after their first IVF attempt. Two semen analyses were performed at the fertility clinic, with the second showing average results after proper collection technique per patient.    SURGICAL HISTORY:  He underwent bilateral varicocelectomies and hydrocele removal with Dr. Rodriguez on September 18th, 2023. Follow-up ultrasound in October 2024 revealed presence of a varicocele.    CURRENT MEDICATIONS:  He was taking Clomid (clomiphene), but has been off medication and would like to restart. He is still taking Cialis 20 mg as needed daily for ED.      ROS:  General: -fever, -chills, -fatigue, -weight gain, -weight loss  Eyes: -vision changes, -redness, -discharge  ENT: -ear pain, -nasal congestion, -sore throat  Cardiovascular: -chest pain, -palpitations, -lower extremity edema  Respiratory: -cough, -shortness of breath  Gastrointestinal: -abdominal pain, -nausea, -vomiting, -diarrhea, -constipation, -blood in stool  Genitourinary: -dysuria, -hematuria, -frequency  Musculoskeletal: -joint pain, -muscle pain  Skin: -rash, -lesion  Neurological: -headache, -dizziness, -numbness, -tingling  Psychiatric: -anxiety, -depression, -sleep difficulty           Objective:      Physical Exam  Vitals and nursing note reviewed.   Constitutional:       General: He is not in acute distress.     Appearance: He is well-developed and normal weight. He is not ill-appearing.   HENT:      Head: Normocephalic and atraumatic.   Eyes:      Pupils: Pupils are equal, round, and reactive to light.   Cardiovascular:      Rate and Rhythm: Normal rate.   Pulmonary:  "     Effort: Pulmonary effort is normal. No respiratory distress.   Abdominal:      Palpations: Abdomen is soft.      Tenderness: There is no abdominal tenderness.   Musculoskeletal:         General: Normal range of motion.      Cervical back: Normal range of motion.   Skin:     General: Skin is warm and dry.   Neurological:      Mental Status: He is alert and oriented to person, place, and time.      Coordination: Coordination normal.   Psychiatric:         Mood and Affect: Mood normal.         Behavior: Behavior normal.         Thought Content: Thought content normal.         Judgment: Judgment normal.         Assessment:       Problem List Items Addressed This Visit       Male fertility problem - Primary    Relevant Medications    clomiPHENE (CLOMID) 50 mg tablet    Other Relevant Orders    Semen Analysis (Completed)    US SCROTUM AND TESTICLES WITH DOPPLER (XPD) (Completed)     Other Visit Diagnoses         Right varicocele        Relevant Orders    US SCROTUM AND TESTICLES WITH DOPPLER (XPD) (Completed)      Testicular microlithiasis        Relevant Orders    US SCROTUM AND TESTICLES WITH DOPPLER (XPD) (Completed)      Erectile dysfunction, unspecified erectile dysfunction type        Relevant Medications    tadalafiL (CIALIS) 20 MG Tab            Plan:           Sukumar MACIAS "Sukumar" was seen today for follow-up.    Diagnoses and all orders for this visit:    Male fertility problem  -     Semen Analysis; Future  -     Discontinue: clomiPHENE (CLOMID) 50 mg tablet; Take 1 tablet (50 mg) by mouth daily for 25 days. Then off for 5 days. Repeat cycle for 6 months.  -     US SCROTUM AND TESTICLES WITH DOPPLER (XPD); Future  -     clomiPHENE (CLOMID) 50 mg tablet; Take 1 tablet (50 mg) by mouth daily for 25 days. Then off for 5 days. Repeat cycle for 6 months.    Right varicocele  -     US SCROTUM AND TESTICLES WITH DOPPLER (XPD); Future    Testicular microlithiasis  -     US SCROTUM AND TESTICLES WITH DOPPLER (XPD); " Future    Erectile dysfunction, unspecified erectile dysfunction type  -     Discontinue: tadalafiL (CIALIS) 20 MG Tab; Take 1 tablet (20 mg total) by mouth daily as needed (erectile dysfunction).  -     Discontinue: tadalafiL (CIALIS) 20 MG Tab; Take 1 tablet (20 mg total) by mouth daily as needed (erectile dysfunction).  -     tadalafiL (CIALIS) 20 MG Tab; Take 1 tablet (20 mg total) by mouth daily as needed (erectile dysfunction).    Other orders  Start back taking Clomid 50 mg daily for 25 days, then off for 5 days. Repeat cycle x5.   Continue taking tadalafil (Cialis) 20 mg as needed daily for ED. REFILLED  Semen analysis (home collect; drop specimen off to [facility] on second floor within 1 hour specimen collection).  Things to consider: In order to have an adequate sperm count, do not ejaculate for 3 days before the test. However, that time should not be longer than 5 days, after which the quality can diminish. Please inform patient that the following may affect a man's fertility: alcohol, many reactional and prescription drugs, and tobacco.     Follow-up pending semen analysis and U/S results.     This note was generated with the assistance of ambient listening technology. Verbal consent was obtained by the patient and accompanying visitor(s) for the recording of patient appointment to facilitate this note. I attest to having reviewed and edited the generated note for accuracy, though some syntax or spelling errors may persist. Please contact the author of this note for any clarification.      Joe Vasquez, DNP

## 2025-07-03 ENCOUNTER — TELEPHONE (OUTPATIENT)
Dept: UROLOGY | Facility: CLINIC | Age: 35
End: 2025-07-03
Payer: MEDICAID

## 2025-07-03 DIAGNOSIS — N46.9 MALE FERTILITY PROBLEM: ICD-10-CM

## 2025-07-03 DIAGNOSIS — I86.1 BILATERAL VARICOCELES: Primary | ICD-10-CM

## 2025-07-03 RX ORDER — SODIUM CHLORIDE 9 MG/ML
INJECTION, SOLUTION INTRAVENOUS CONTINUOUS
OUTPATIENT
Start: 2025-07-03

## 2025-07-03 RX ORDER — CEFAZOLIN SODIUM 2 G/50ML
2 SOLUTION INTRAVENOUS
OUTPATIENT
Start: 2025-07-03

## 2025-07-03 NOTE — TELEPHONE ENCOUNTER
Spoke with patient about semen analysis and plan of care, pt verbally understood and would like to move forward with Bilateral varicocelectomy.

## 2025-07-03 NOTE — TELEPHONE ENCOUNTER
----- Message from Joe Vasquez DNP sent at 7/3/2025  3:47 PM CDT -----  Please inform patient via telephone that his semen analysis is still abnormal, but has shown improvement compared to previous semen analysis from last year. Bilateral varicocelectomy recommended at   this time. Let me know how patient would like to move forward. Thanks.   ----- Message -----  From: Lab, Background User  Sent: 6/23/2025   3:36 PM CDT  To: Joe Vasquez DNP

## 2025-07-03 NOTE — PROGRESS NOTES
Diagnoses and all orders for this visit:    Bilateral varicoceles  -     Case Request Operating Room: EXCISION, VARICOCELE  -     Vital Signs ; Standing  -     Notify physician ; Standing  -     Diet NPO; Standing  -     Basic metabolic panel; Future  -     CBC auto differential; Future  -     Urinalysis; Future  -     Urine culture; Future  -     Place in Outpatient; Standing  -     Place NICOLE hose; Standing  -     Diet NPO    Male fertility problem  -     Case Request Operating Room: EXCISION, VARICOCELE  -     Vital Signs ; Standing  -     Notify physician ; Standing  -     Diet NPO; Standing  -     Basic metabolic panel; Future  -     CBC auto differential; Future  -     Urinalysis; Future  -     Urine culture; Future  -     Place in Outpatient; Standing  -     Place NICOLE hose; Standing  -     Diet NPO    Other orders  -     0.9% NaCl infusion  -     IP VTE LOW RISK PATIENT; Standing  -     cefazolin (ANCEF) 2 gram in dextrose 5% 50 mL IVPB (premix)    Schedule patient for bilateral varicocelectomy by Dr. Rodriguez.   Surgery team will call you to select surgery date and discuss pre-op instructions.   Pre-op labs needed (CBC, BMP, U/A, and urine cx).  Continue to avoid the use of aspirin and aspirin containing products for 7 days prior to surgery to reduce risk of bleeding.   Urology clinic to provide patient with surgery packet    Follow-up post-op.     Joe Vasquez, DNP

## 2025-07-29 ENCOUNTER — OCCUPATIONAL HEALTH (OUTPATIENT)
Dept: URGENT CARE | Facility: CLINIC | Age: 35
End: 2025-07-29

## 2025-07-29 DIAGNOSIS — Z02.89 ENCOUNTER FOR EXAMINATION REQUIRED BY DEPARTMENT OF TRANSPORTATION (DOT): Primary | ICD-10-CM

## 2025-07-29 PROCEDURE — 99499 UNLISTED E&M SERVICE: CPT | Mod: S$GLB,,, | Performed by: STUDENT IN AN ORGANIZED HEALTH CARE EDUCATION/TRAINING PROGRAM

## 2025-08-13 ENCOUNTER — PATIENT MESSAGE (OUTPATIENT)
Dept: UROLOGY | Facility: CLINIC | Age: 35
End: 2025-08-13
Payer: MEDICAID